# Patient Record
Sex: FEMALE | Race: WHITE
[De-identification: names, ages, dates, MRNs, and addresses within clinical notes are randomized per-mention and may not be internally consistent; named-entity substitution may affect disease eponyms.]

---

## 2017-02-22 ENCOUNTER — HOSPITAL ENCOUNTER (EMERGENCY)
Dept: HOSPITAL 25 - UCCORT | Age: 37
Discharge: HOME | End: 2017-02-22
Payer: COMMERCIAL

## 2017-02-22 VITALS — SYSTOLIC BLOOD PRESSURE: 113 MMHG | DIASTOLIC BLOOD PRESSURE: 63 MMHG

## 2017-02-22 DIAGNOSIS — J32.9: Primary | ICD-10-CM

## 2017-02-22 PROCEDURE — 99212 OFFICE O/P EST SF 10 MIN: CPT

## 2017-02-22 PROCEDURE — G0463 HOSPITAL OUTPT CLINIC VISIT: HCPCS

## 2017-02-22 NOTE — UC
Throat Pain/Nasal Long HPI





- HPI Summary


HPI Summary: 





HA, sinus congestion,  cough for 2 weeks. 





- History of Current Complaint


Chief Complaint: UCGeneralIllness


Stated Complaint: HA,CONGESTION,SINUS PAIN


Time Seen by Provider: 02/22/17 17:13


Hx Obtained From: Patient


Hx Last Menstrual Period: 2/8/17


Pregnant?: No


Onset/Duration: Sudden Onset, Lasting Days


Severity: Moderate


Pain Intensity: 6


Pain Scale Used: 0-10 Numeric


Cough: None


Associated Signs & Symptoms: Positive: Sinus Discomfort, Nasal Discharge





- Epiglottits Risk Factors


Epiglottis Risk Factors: Negative





- Allergies/Home Medications


Allergies/Adverse Reactions: 


 Allergies











Allergy/AdvReac Type Severity Reaction Status Date / Time


 


Bacitracin [From Neosporin] Allergy  OINTMENT Verified 04/20/16 10:31





   CAUSES RASH  


 


Neomycin [From Neosporin] Allergy  OINTMENT Verified 04/20/16 10:31





   CAUSES RASH  


 


Polymyxin B [From Neosporin] Allergy  OINTMENT Verified 04/20/16 10:31





   CAUSES RASH  














PMH/Surg Hx/FS Hx/Imm Hx


Previously Healthy: Yes


Endocrine History Of: Reports: Diabetes





- Surgical History


Surgical History: None


Surgery Procedure, Year, and Place: tubal ligation,plantar fasciatis, right 

hand carpal tunnel, right thumb trigger thumb





- Family History


Known Family History: Positive: Cardiac Disease, Hypertension, Diabetes





- Social History


Alcohol Use: None


Substance Use Type: None


Smoking Status (MU): Never Smoked Tobacco





Review of Systems


Constitutional: Fever, Fatigue


Skin: Negative


Eyes: Negative


ENT: Sore Throat, Nasal Discharge


Respiratory: Negative


Cardiovascular: Negative


Gastrointestinal: Negative


Genitourinary: Negative


Motor: Negative


Neurovascular: Negative


Musculoskeletal: Negative


Neurological: Headache


Psychological: Negative


All Other Systems Reviewed And Are Negative: Yes





Physical Exam


Triage Information Reviewed: Yes


Appearance: Well-Nourished, Ill-Appearing, Pain Distress


Vital Signs: 


 Initial Vital Signs











Temp  100.2 F   02/22/17 17:13


 


Pulse  88   02/22/17 17:13


 


Resp  16   02/22/17 17:13


 


BP  113/63   02/22/17 17:13


 


Pulse Ox  100   02/22/17 17:13











Vital Signs Reviewed: Yes


Eye Exam: Normal


Eyes: Positive: Conjunctiva Clear


ENT Exam: Normal


ENT: Positive: Normal ENT inspection, Pharyngeal erythema, Nasal congestion, 

Nasal drainage, TM bulging


Dental Exam: Normal


Neck exam: Normal


Neck: Positive: Supple, Nontender, No Lymphadenopathy


Respiratory Exam: Normal


Respiratory: Positive: Chest non-tender, Lungs clear, Normal breath sounds


Cardiovascular Exam: Normal


Cardiovascular: Positive: RRR, No Murmur, Pulses Normal


Abdominal Exam: Normal


Abdomen Description: Positive: Nontender, No Organomegaly, Soft


Bowel Sounds: Positive: Present


Musculoskeletal Exam: Normal


Musculoskeletal: Positive: Strength Intact, ROM Intact, No Edema


Neurological Exam: Normal


Neurological: Positive: Alert, Muscle Tone Normal


Psychological Exam: Normal


Skin Exam: Normal





Throat Pain/Nasal Course/Dx





- Course


Course Of Treatment: hx obtained, exam performed, meds reviewed, meds 

prescribed for sinusitis





- Differential Dx/Diagnosis


Differential Diagnosis/HQI/PQRI: Influenza, Laryngitis, Pharyngitis, Sinusitis, 

Tonsillitis, URI


Provider Diagnoses: sinusitis





Discharge





- Discharge Plan


Condition: Stable


Disposition: HOME


Prescriptions: 


Amoxicillin/Clavulanate TAB* [Augmentin *] 875 mg PO BID #14 tab


Fluconazole 150 MG (NF) [Diflucan 150 mg (NF)] 150 mg PO ONCE #1 tab


predniSONE TAB* [Deltasone TAB*] 40 mg PO DAILY #10 tab


Patient Education Materials:  Sinusitis (ED)


Additional Instructions: 


take the medication as prescribed. Increase your fluid intake and get plenty of 

rest.


Tylenol or Ibuprofen for pain and fever.

## 2017-04-04 NOTE — UC
Back Pain HPI





- HPI Summary


HPI Summary: 





37 yo female with the onset of right LBP 4-5 days ago


hurts to twist or lift





no known trauma





no bowel or bladder dysfunction





no radiation of pain down leg











- History of Current Complaint


Chief Complaint: UCBackPain


Stated Complaint: LEFT LOWER BACK PAIN


Time Seen by Provider: 04/04/17 15:49


Hx Obtained From: Patient


Hx Last Menstrual Period: 4/3/17


Onset/Duration: Gradual Onset


Timing: Constant


Severity Initially: Moderate


Severity Currently: Moderate


Pain Intensity: 4 - worse at night/hard time sleeping


Pain Scale Used: 0-10 Numeric


Back Pain: Is Discrete @ - see image


Character: Dull, Throbbing, Spasmodic


Aggravating: Movement, Lifting, Bending


Alleviating: Rest


Associated Signs And Symptoms: Positive: Negative





- Allergies/Home Medications


Allergies/Adverse Reactions: 


 Allergies











Allergy/AdvReac Type Severity Reaction Status Date / Time


 


Bacitracin [From Neosporin] Allergy  OINTMENT Verified 04/04/17 15:50





   CAUSES RASH  


 


Neomycin [From Neosporin] Allergy  OINTMENT Verified 04/04/17 15:50





   CAUSES RASH  


 


Polymyxin B [From Neosporin] Allergy  OINTMENT Verified 04/04/17 15:50





   CAUSES RASH  














PMH/Surg Hx/FS Hx/Imm Hx


Previously Healthy: Yes


Endocrine History Of: Reports: Diabetes





- Surgical History


Surgical History: None


Surgery Procedure, Year, and Place: tubal ligation,plantar fasciatis, right 

hand carpal tunnel, right thumb trigger thumb





- Family History


Known Family History: Positive: Cardiac Disease, Hypertension, Diabetes





- Social History


Alcohol Use: None


Substance Use Type: None


Smoking Status (MU): Never Smoked Tobacco





Review of Systems


Constitutional: Negative


Skin: Negative


Eyes: Negative


ENT: Negative


Respiratory: Negative


Cardiovascular: Negative


Gastrointestinal: Negative


Genitourinary: Negative


Motor: Negative


Neurovascular: Negative


Musculoskeletal: Myalgia


Neurological: Negative


Psychological: Negative


All Other Systems Reviewed And Are Negative: Yes





Physical Exam


Triage Information Reviewed: Yes


Appearance: Well-Appearing, No Pain Distress, Well-Nourished


Vital Signs: 


 Initial Vital Signs











Temp  99.5 F   04/04/17 15:46


 


Pulse  83   04/04/17 15:46


 


Resp  16   04/04/17 15:46


 


BP  113/71   04/04/17 15:46


 


Pulse Ox  99   04/04/17 15:46











Eye Exam: Normal


Eyes: Positive: Conjunctiva Clear


ENT: Positive: Hearing grossly normal.  Negative: Nasal congestion, Nasal 

drainage, Tonsillar swelling, Tonsillar exudate, Trismus, Muffled/hoarse voice


Neck: Positive: Supple, Nontender


Respiratory: Positive: Lungs clear, Normal breath sounds, No respiratory 

distress


Cardiovascular: Positive: RRR, No Murmur


Musculoskeletal: Positive: ROM Intact, No Edema


Neurological: Positive: Alert, Other: - DTRs brisk and symmetrical/(-) SLR


Psychological Exam: Normal


Skin Exam: Normal





Back Pain Course/Dx





- Differential Dx/Diagnosis


Provider Diagnoses: acute lumbar strain





Discharge





- Discharge Plan


Condition: Stable


Disposition: HOME


Prescriptions: 


Cyclobenzaprine TAB* [Flexeril TAB*] 5 mg PO TID PRN #21 tab


 PRN Reason: Spasms


HYDROcodone/ACETAMIN 5-325 MG* [Norco 5-325 TAB*] 1 tab PO Q4H PRN #10 tab MDD 2


 PRN Reason: Pain


Naproxen [Naproxen 500 MG TABS] 500 mg PO BID PRN #30 tab


 PRN Reason: Pain


Patient Education Materials:  Low Back Strain (ED)


Referrals: 


Tatiana Quiles [Primary Care Provider] - 1 Week (if not better)


Additional Instructions: 


don't take narcotic or muscle relaxant and drive or work





Images


Front/Back of Body, Lg (Mono): 


  __________________________














  __________________________





 1 - pain here
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
## 2018-10-31 NOTE — UC
Throat Pain/Nasal Long HPI





- HPI Summary


HPI Summary: 


37-year-old female presents with 4 day history of sore throat.  Denies fever, 

chills, ear pain, nasal congestion, dysphagia, cough, chest pain, shortness of 

breath, abdominal pain, nausea, or vomiting.








- History of Current Complaint


Chief Complaint: UCGeneralIllness


Stated Complaint: SORE THROAT


Time Seen by Provider: 10/31/18 18:09


Hx Obtained From: Patient


Hx Last Menstrual Period: 10/13/18


Pregnant?: No


Onset/Duration: Gradual Onset, Lasting Days - 4


Severity: Moderate


Pain Intensity: 8


Cough: None


Associated Signs & Symptoms: Negative: Dysphagia, Drooling, Wheezing, Hoarseness

, Sinus Discomfort, Nasal Discharge, Fever, Vomiting, Rash





- Allergies/Home Medications


Allergies/Adverse Reactions: 


 Allergies











Allergy/AdvReac Type Severity Reaction Status Date / Time


 


amoxicillin [From Augmentin] Allergy Intermediate Hives Verified 10/31/18 18:10


 


bacitracin Allergy Intermediate Rash Verified 10/31/18 18:10


 


clavulanic acid Allergy Intermediate Hives Verified 10/31/18 18:10





[From Augmentin]     














PMH/Surg Hx/FS Hx/Imm Hx


Previously Healthy: Yes


Endocrine History: Diabetes





- Surgical History


Surgical History: Yes


Surgery Procedure, Year, and Place: tubal ligation,plantar fasciatis, right 

hand carpal tunnel, right thumb trigger thumb





- Family History


Known Family History: Positive: Cardiac Disease, Hypertension


   Negative: Diabetes





- Social History


Occupation: Employed Full-time


Lives: With Family


Alcohol Use: None


Substance Use Type: None


Smoking Status (MU): Never Smoked Tobacco





Review of Systems


Constitutional: Negative


Skin: Negative


Eyes: Negative


ENT: Sore Throat


Respiratory: Negative


Cardiovascular: Negative


Gastrointestinal: Negative


Is Patient Immunocompromised?: No


All Other Systems Reviewed And Are Negative: Yes





Physical Exam


Triage Information Reviewed: Yes


Appearance: Well-Appearing, No Pain Distress, Obese


Vital Signs: 


 Initial Vital Signs











Temp  97.6 F   10/31/18 18:00


 


Pulse  77   10/31/18 18:00


 


Resp  16   10/31/18 18:00


 


BP  119/92   10/31/18 18:00


 


Pulse Ox  100   10/31/18 18:00











Vital Signs Reviewed: Yes


Eyes: Positive: Conjunctiva Clear.  Negative: Discharge


ENT: Positive: Hearing grossly normal, Pharyngeal erythema - With cobblestoning

, TMs normal, Uvula midline.  Negative: Nasal congestion, Nasal drainage, 

Tonsillar swelling, Tonsillar exudate, Trismus, Sinus tenderness


Neck: Positive: Supple, Nontender, No Lymphadenopathy


Respiratory: Positive: Lungs clear, Normal breath sounds, No respiratory 

distress


Cardiovascular: Positive: RRR, No Murmur


Neurological: Positive: Alert


Skin Exam: Normal





Throat Pain/Nasal Course/Dx





- Course


Course Of Treatment: 37-year-old female presents with 4 day history of sore 

throat.  Afebrile.  Exam remarkable for some pharyngeal erythema without 

tonsillar edema or exudate.  Symptoms are likely viral in nature.  Recommend 

symptomatic treatment.  She is to follow-up with her primary care provider in 7 

days if symptoms persist.  Warning symptoms were reviewed with the patient.  

Verbalizes understanding and agrees with plan of care.





- Differential Dx/Diagnosis


Differential Diagnosis/HQI/PQRI: Mononucleosis, Pharyngitis, Tonsillitis


Provider Diagnoses: Viral pharyngitis





Discharge





- Sign-Out/Discharge


Documenting (check all that apply): Patient Departure


All imaging exams completed and their final reports reviewed: No Studies





- Discharge Plan


Condition: Stable


Disposition: HOME


Patient Education Materials:  Pharyngitis (ED)


Referrals: 


Flower Gallagher PA [Primary Care Provider] - 7 Days (If symptoms persist)


Additional Instructions: 


Your rapid strep test in the clinic today was negative.  Your symptoms are 

likely from a viral infection.  Viral infections do not respond to antibiotics 

and typically run their course over 7-10 days.





Use salt water gargles several times a day for your sore throat.





Take acetaminophen (Tylenol) or ibuprofen (Advil, Motrin) according to 

directions as needed for fever or pain.





You may also use Chloraseptic spray or Cepacol lozenges for some temporary pain 

relief from your sore throat.





Follow-up with your primary care provider in 7 days if symptoms persist.





Seek immediate medical attention if you have a persistent fever greater than 

100.5 F despite taking acetaminophen or ibuprofen, you are unable to swallow, 

has difficulty breathing, or have any worsening of symptoms.





- Billing Disposition and Condition


Condition: STABLE


Disposition: Home

## 2018-10-31 NOTE — XMS REPORT
Ana Laura Horton

 Created on:2018



Patient:Ana Laura Horton

Sex:Female

:1980

External Reference #:[id number]





Demographics







 Address  [address]



   [place] [address]

 

 Home Phone  [phone]

 

 Mobile Phone  [phone]

 

 Preferred Language  English

 

 Marital Status  Not  Or 

 

 Synagogue Affiliation  Unknown

 

 Race  White

 

 Ethnic Group  Not  Or 









Author







 Organization  [facility]

 

 Address  [address]



   [place] [address]

 

 Phone  [phone]









Support







 Name  Relationship  Address  Phone

 

 cheri Porter  Mother  Unavailable  [phone]

 

 Sunil Miramontes  Significant Other  Unavailable  [phone]









Care Team Providers







 Name  Role  Phone

 

 Flower Gallagher PA  Care Team Information   Unavailable









Payers







 Type  Date  Identification Numbers  Payment Provider  Subscriber

 

 Health Maintenance    Policy Number:  [facility] / Neftaly Horton



 Organization (O)    [id number]  Plan  









 Group Number: [id number]  [address]

 

 PayID: [id number]  [place] [address]







Problems







 Description

 

 No Information







Social History







 Type  Date  Description  Comments

 

 Marital Status    Single  

 

 Occupation    /  [employer]

 

 Occupation      [employer]

 

 ETOH Use    Denies alcohol use  

 

 Smoking    Patient has never smoked  

 

 Daily Caffeine    Consumes on average 4 cups of coffee per day  







Allergies, Adverse Reactions, Alerts







 Date  Description  Reaction  Status  Severity  Comments

 

 2016  Neosporin    active    Rash

 

 2017  Augmentin    active    Hives







Medications







 Medication  Date  Status  Form  Strength  Qnty  SIG  Indications  Ordering



                 Provider

 

 Cyclobenzaprine  10/25/  Active  Tablets  5mg  30tabs  1 tablet  M54.5  Randall,



 HCL            by mouth    Braden,



             as needed    DO



             at    



             bedtime    

 

 Sumatriptan  /  Active  Tablets  25mg  30tabs  1 tabelt  G43.009  Aileen,



 Succinate            by mouth    Flower,



             as needed    PA



             for    



             headache    

 

 Metformin HCL  03/10/  Active  Tablets  500mg  180tab  1 by  E11.65  Randall



           s  mouth    Braden,



             twice a    DO



             day    

 

 Multi For Her  /  Active  Tablets      1 by    Noble,



   2016          mouth    Braden,



             every day    DO

 

                 

 

 Fluconazole  /  Hx  Tablets  150mg  1tabs  1 tablet  J01.00  Randall,



   2017 -          by mouth    Braden,



   /          x 1    DO



   2017              

 

 Bactrim DS  /  Hx  Tablets  800-160mg  14tabs  1 pill  J01.00  Digiovann



   2017 -          twice a    a,



   /          day for 7    Mariano,



   2017          days    MD

 

 Amoxicillin/Clavu  /  Hx  Tablets  875-125mg  14tabs  1 by  J01.00  Randall



 lanate Potassium  2017 -          mouth    Braden,



   /          twice a    DO



   2017          day x 7    



             days    

 

 Fluconazole  /  Hx  Tablets  150mg  1tabs  1 tablet  J01.00  Randall,



   2017 -          by mouth    Braden,



   /          x 1    DO



                 

 

 Fluconazole  /  Hx  Tablets  150mg  1tabs  1 tablet    Aileen,



   2017 -          by mouth    Flower,



   10/13/          x 1    PA



                 

 

 Cephalexin  /  Hx  Capsules  500mg  14caps  1 capsule  N89.8  Aileen,



    -          by mouth    Flower,



   10/13/          twice    PA



             daily for    



             7 days    

 

 Gabapentin  /  Hx  Capsules  300mg  14caps  1 by  M54.31  Randall,



    -          mouth qhs    Braden,



   /          x 14 days    DO



                 

 

 Sulfamethoxazole/  10/18/  Hx  Tablets  800-160mg  20tabs  1 by  L02.216  Randall
,



 Trimethoprim DS   -          mouth    Braden,



   10/28/          twice a    DO



             day    

 

 Fluconazole  10/18/  Hx  Tablets  150mg  1tabs  1 by    Noble,



    -          mouth    Braden,



   10/19/          daily as    DO



   2016          needed    

 

 Mupirocin Calcium  /  Hx  Cream  2%  15gm  apply to  R21  Noble,



   2016 -          lower    Braden,



   /          LEFT lip    DO



             bid x 10    



             days    

 

 Fluconazole  /  Hx  Tablets  150mg  1tabs  1 by    Noble,



    -          mouth    Braden,



   /          daily as    DO



   2016          needed    

 

 Sulfamethoxazole/  /  Hx  Tablets  800-160mg  10tabs  1 by    Randall,



 Trimethoprim DS   -          mouth    Braden,



   /          twice a    DO



             day    

 

 Benzonatate  /  Hx  Capsules  200mg  30caps  1 by  R05  Randall,



   2016 -          mouth    Braden,



   08/12/          three    DO



   2016          times a    



             day as    



             needed    



             cough    







Immunizations







 CPT Code  Status  Date  Vaccine  Lot #

 

   Given  2018  Flu Vaccine NOS  

 

   Given  09/15/2017  Fluvirin Immunization  







Vital Signs







 Date  Vital  Result  Comment

 

 10/25/2018  Weight  155.00 lb  









 Heart Rate  74 /min  

 

 BP Systolic  110 mmHg  

 

 BP Diastolic  72 mmHg  

 

 Respiratory Rate  18 /min  

 

 Height  60 inches  5'0"

 

 BMI (Body Mass Index)  30.3 kg/m2  









 2018  Weight  161.00 lb  









 Heart Rate  74 /min  

 

 BP Systolic  110 mmHg  

 

 BP Diastolic  68 mmHg  

 

 Respiratory Rate  18 /min  

 

 Height  59.75 inches  4'11.75"

 

 BMI (Body Mass Index)  31.7 kg/m2  









 2017  Body Temperature  99.0 F  









 Weight  157.00 lb  

 

 Heart Rate  76 /min  

 

 BP Systolic  106 mmHg  

 

 BP Diastolic  66 mmHg  

 

 Respiratory Rate  18 /min  

 

 Height  59.75 inches  4'11.75"

 

 BMI (Body Mass Index)  30.9 kg/m2  









 10/13/2017  Weight  156.00 lb  









 Heart Rate  70 /min  

 

 BP Systolic  110 mmHg  

 

 BP Diastolic  64 mmHg  

 

 Respiratory Rate  18 /min  

 

 Height  59.75 inches  4'11.75"

 

 BMI (Body Mass Index)  30.7 kg/m2  









 2017  Weight  156.00 lb  









 Heart Rate  70 /min  

 

 BP Systolic  136 mmHg  

 

 BP Diastolic  70 mmHg  

 

 Respiratory Rate  18 /min  

 

 Height  59.75 inches  4'11.75"

 

 BMI (Body Mass Index)  30.7 kg/m2  









 2017  Weight  154.00 lb  









 Heart Rate  70 /min  

 

 BP Systolic  120 mmHg  

 

 BP Diastolic  70 mmHg  

 

 Respiratory Rate  18 /min  

 

 Height  59.75 inches  4'11.75"

 

 BMI (Body Mass Index)  30.3 kg/m2  









 2017  Body Temperature  99.3 F  









 Weight  160.00 lb  

 

 Heart Rate  68 /min  

 

 BP Systolic  112 mmHg  

 

 BP Diastolic  72 mmHg  

 

 Respiratory Rate  18 /min  

 

 Height  59.75 inches  4'11.75"

 

 BMI (Body Mass Index)  31.5 kg/m2  









 2017  Body Temperature  99.1 F  









 Weight  151.00 lb  

 

 BP Systolic  114 mmHg  

 

 BP Diastolic  60 mmHg  

 

 Height  59.75 inches  4'11.75"

 

 BMI (Body Mass Index)  29.7 kg/m2  









 2017  Body Temperature  99.5 F  









 Weight  155.00 lb  

 

 Heart Rate  86 /min  

 

 BP Systolic  122 mmHg  

 

 BP Diastolic  72 mmHg  

 

 Respiratory Rate  19 /min  

 

 Height  59.75 inches  4'11.75"

 

 O2 % BldC Oximetry  99 %  Ra

 

 BMI (Body Mass Index)  30.5 kg/m2  









 2016  Weight  158.00 lb  









 Heart Rate  84 /min  

 

 BP Systolic  130 mmHg  

 

 BP Diastolic  80 mmHg  

 

 Respiratory Rate  18 /min  

 

 Height  59.75 inches  4'11.75"

 

 BMI (Body Mass Index)  31.1 kg/m2  









 11/15/2016  Body Temperature  98.9 F  









 Weight  155.00 lb  

 

 Heart Rate  72 /min  

 

 BP Systolic  110 mmHg  

 

 BP Diastolic  64 mmHg  

 

 Respiratory Rate  18 /min  

 

 Height  59.75 inches  4'11.75"

 

 BMI (Body Mass Index)  30.5 kg/m2  









 10/18/2016  Body Temperature  98.4 F  









 Weight  148.00 lb  

 

 Heart Rate  80 /min  

 

 BP Systolic  112 mmHg  

 

 BP Diastolic  72 mmHg  

 

 Respiratory Rate  18 /min  

 

 Height  59.75 inches  4'11.75"

 

 BMI (Body Mass Index)  29.1 kg/m2  









 2016  Body Temperature  98.3 F  









 Weight  155.00 lb  

 

 Heart Rate  70 /min  

 

 BP Systolic  110 mmHg  

 

 BP Diastolic  72 mmHg  

 

 Respiratory Rate  18 /min  

 

 Height  59.75 inches  4'11.75"

 

 BMI (Body Mass Index)  30.5 kg/m2  









 08/15/2016  Body Temperature  98.6 F  









 Weight  153.00 lb  

 

 Heart Rate  74 /min  

 

 BP Systolic  110 mmHg  

 

 BP Diastolic  72 mmHg  

 

 Respiratory Rate  18 /min  

 

 Height  59.75 inches  4'11.75"

 

 BMI (Body Mass Index)  30.1 kg/m2  









 2016  Body Temperature  99.3 F  









 Weight  152.00 lb  

 

 Heart Rate  95 /min  

 

 BP Systolic  122 mmHg  

 

 BP Diastolic  78 mmHg  

 

 Respiratory Rate  18 /min  

 

 Height  59.75 inches  4'11.75"

 

 O2 % BldC Oximetry  99 %  

 

 BMI (Body Mass Index)  29.9 kg/m2  









 2016  Weight  152.00 lb  









 Heart Rate  80 /min  

 

 BP Systolic  108 mmHg  

 

 BP Diastolic  60 mmHg  

 

 Respiratory Rate  18 /min  

 

 Height  59.75 inches  4'11.75"

 

 BMI (Body Mass Index)  29.9 kg/m2  









 2016  Body Temperature  99.4 F  









 Weight  158.00 lb  

 

 Heart Rate  74 /min  

 

 BP Systolic  120 mmHg  

 

 BP Diastolic  74 mmHg  

 

 Respiratory Rate  18 /min  

 

 Height  59.75 inches  4'11.75"

 

 BMI (Body Mass Index)  31.1 kg/m2  









 2016  Weight  162.00 lb  









 Heart Rate  78 /min  

 

 BP Systolic  112 mmHg  

 

 BP Diastolic  82 mmHg  

 

 Respiratory Rate  18 /min  

 

 Height  59.75 inches  4'11.75"

 

 BMI (Body Mass Index)  31.9 kg/m2  









 03/10/2016  Weight  169.00 lb  









 Heart Rate  68 /min  

 

 BP Systolic  126 mmHg  

 

 BP Diastolic  70 mmHg  

 

 Respiratory Rate  18 /min  

 

 Height  59.75 inches  4'11.75"

 

 BMI (Body Mass Index)  33.3 kg/m2  









 2016  Weight  167.00 lb  









 Heart Rate  110 /min  

 

 BP Systolic  118 mmHg  

 

 BP Diastolic  76 mmHg  

 

 Respiratory Rate  18 /min  

 

 Height  59.75 inches  4'11.75"

 

 O2 % BldC Oximetry  98 %  

 

 BMI (Body Mass Index)  32.9 kg/m2  







Results







 Test  Date  Test  Result  H/L  Range  Note

 

 Hemoglobin A1c  10/19/2018  Hemoglobin A1c  6.7 %  High  4.1-5.9  









 Estimated Average Glucose Calc  146 mg/dL  High    









 Comprehensive Metabolic-RL  10/19/2018  Sodium  142 mmol/L    135-146  1









 Potassium  3.5 mmol/L    3.5-5.2  

 

 Chloride#  102 mmol/L      2

 

 Carbon Dioxide  29 mmol/L    24-34  

 

 Glucose  117 mg/dL  High    

 

 BUN  12 mg/dL    6-26  

 

 Creatinine  0.7 mg/dL    0.5-1.4  

 

 Calcium  9.6 mg/dL    8.5-10.2  

 

 Total Protein  6.7 g/dL    6.0-8.0  

 

 Albumin  4.5 g/dL    3.6-4.9  

 

 Globulin  2.2 g/dL    2.0-3.5  

 

 A/G Ratio  2.0 Ratio    1.0-2.2  

 

 Total Bilirubin  0.5 mg/dL    0.1-1.3  

 

 Alkaline Phosphatase  58 U/L      

 

 Alt  26 U/L    3-42  

 

 Ast  17 U/L    8-42  

 

  Angy Egfr  >60    >60  3

 

 Non  Angy Egfr  >60    >60  4

 

 Anion Gap  11 mmol/L    5-15  5









 Lipid  10/19/2018  Cholesterol  205 mg/dL  High    









 Triglycerides  173 mg/dL      

 

 HDL  49 mg/dL    35-85  6

 

 Chol/ HDL Ratio  4.2 ratio    3.7-5.6  

 

 VLDL  35 mg/dL  High  2-29  

 

 LDL (Calc)  121 mg/dL  High  20-99  7









 Hemoglobin A1c  2018  Hemoglobin A1c  7.4 %  High  4.1-5.9  









 Estimated Average Glucose Calc  166 mg/dL  High    









 Comprehensive Met Panel-FCMG  2018  Sodium  138 mmol/L    135-146  8









 Potassium  4.0 mmol/L    3.5-5.2  

 

 Chloride#  100 mmol/L      9

 

 Carbon Dioxide  33 mmol/L    24-34  

 

 Glucose  123 mg/dL  High    

 

 BUN  13 mg/dL    6-26  

 

 Creatinine  0.6 mg/dL    0.5-1.4  

 

 Calcium  9.7 mg/dL    8.5-10.2  

 

 Total Protein  6.7 g/dL    6.0-8.0  

 

 Albumin  4.7 g/dL    3.6-4.9  

 

 Globulin  2.0 g/dL    2.0-3.5  

 

 A/G Ratio  2.4 Ratio  High  1.0-2.2  

 

 Total Bilirubin  0.6 mg/dL    0.1-1.3  

 

 Alkaline Phosphatase  60 U/L      

 

 Alt  20 U/L    3-42  

 

 Ast  16 U/L    8-42  

 

  Angy Egfr  >60    >60  10

 

 Non  Angy Egfr  >60    >60  11

 

 Anion Gap  5 mmol/L    5-15  12









 CBC With Auto Diff  2018  WBC  10.8 K/uL    4.1-11.0  









 RBC  5.08 M/uL    4.00-5.40  

 

 Hemoglobin  13.8 gm/dL    12.0-16.0  

 

 Hematocrit  42.1 %    36.0-47.0  

 

 MCV  82.8 fL    80.0-97.0  

 

 MCH  27.2 pg    27.0-32.0  

 

 MCHC  32.9 g/dL    32.0-36.0  

 

 RDW  13.1 %    11.5-14.5  

 

 PLT Count  243 K/ul    140-400  

 

 MPV  9.6 FL    7.1-10.7  

 

 Neutrophil  61.0 %    35.0-75.0  

 

 Lymphocyte  31.0 %    16.0-52.0  

 

 Monocyte  6.0 %    2.0-10.0  

 

 Eosinophil  0.9 %    0.0-5.0  

 

 Basophil  1.1 %    0.0-4.0  

 

 Abs Neutrophils  6.6 K/uL    2.1-8.0  

 

 Abs Lymphocytes  3.4 K/uL    0.8-5.5  

 

 Abs Monocytes  0.7 K/uL    0.1-1.0  

 

 Abs Eosinophils  0.1 K/uL    0.0-0.5  

 

 Abs Basophils  0.1 K/uL    0.0-0.3  









 Basic (BMP)  2018  Sodium  138 mmol/L    135-146  13









 Potassium  3.9 mmol/L    3.5-5.2  

 

 Chloride#  100 mmol/L      14

 

 Carbon Dioxide  34 mmol/L    24-34  

 

 Glucose  123 mg/dL  High    

 

 BUN  13 mg/dL    6-26  

 

 Creatinine  0.7 mg/dL    0.5-1.4  

 

 Calcium  9.7 mg/dL    8.5-10.2  

 

 Non  Angy Egfr  >60    >60  15

 

  Angy Egfr  >60    >60  16

 

 Anion Gap  4 mmol/L  Low  5-15  17









 Lipid  2018  Cholesterol  231 mg/dL  High    









 Triglycerides  130 mg/dL      

 

 HDL  55 mg/dL    35-85  18

 

 Chol/ HDL Ratio  4.2 ratio    3.7-5.6  

 

 VLDL  26 mg/dL    2-29  

 

 LDL (Calc)  150 mg/dL  High  20-99  19









 Hemoglobin W4l-TNOG  10/07/2017  Glycohemoglobin (A1c)  6.9 %  High  4.2-6.3  
20, 21









 eAG  151 mg/dL      20









 Basic Metabolic Panel  10/07/2017  Glucose  151 mg/dL  High    20









 BUN  15 mg/dL    7-18  20

 

 Creatinine  0.7 mg/dL    0.6-1.3  20

 

 Glom Filtration Rate, Estimate  >60 mL/min    >60  20

 

 If   >60 mL/min    >60  20, 22

 

 BUN/Creat  21.4 ratio      20

 

 Sodium  143 mmol/L    136-145  20

 

 Potassium  3.5 mmol/L    3.5-5.1  20

 

 Chloride  107 mmol/L      20

 

 Carbon Dioxide  28 mmol/L    21-32  20

 

 Anion Gap  8 mEq/L    8-16  20

 

 Calcium  9.0 mg/dL    8.5-10.1  20









 LDL Cholesterol Profile  10/07/2017  Cholesterol  206 mg/dL  High  <200  20, 23









 Triglycerides  148 mg/dL    <150  20, 24

 

 HDL Cholesterol  49 mg/dL    >40  20, 25

 

 LDL-Cholesterol  127 mg/dL    < 100  20, 26









 Affirm  2017  Trichomonas Vaginalis  Negative    Negative  









 Gardnerella Vaginalis  Negative    Negative  

 

 Candida Species  Negative    Negative  









 Basic (BMP)  2017  Sodium  141 mmol/L    135-146  27









 Potassium  4.1 mmol/L    3.5-5.2  

 

 Chloride#  103 mmol/L      28

 

 Carbon Dioxide  30 mmol/L    24-34  

 

 Glucose  141 mg/dL  High    

 

 BUN  12 mg/dL    6-26  

 

 Creatinine  0.6 mg/dL    0.5-1.4  

 

 Calcium  9.3 mg/dL    8.5-10.2  

 

 Non  Angy Egfr  >60    >60  29

 

  Angy Egfr  >60    >60  30

 

 Anion Gap  12 mmol/L    7-16  31









 CBC With Auto Diff  2017  WBC  8.4 K/uL    4.1-11.0  









 RBC  4.91 M/uL    4.00-5.40  

 

 Hemoglobin  13.7 gm/dL    12.0-16.0  

 

 Hematocrit  41.4 %    36.0-47.0  

 

 MCV  84.3 fL    80.0-97.0  

 

 MCH  27.9 pg    27.0-32.0  

 

 MCHC  33.1 g/dL    32.0-36.0  

 

 RDW  13.4 %    11.5-14.5  

 

 PLT Count  230 K/ul    140-400  

 

 Neutrophil  66.8 %    35.0-75.0  

 

 Lymphocyte  24.6 %    16.0-52.0  

 

 Monocyte  6.9 %    2.0-10.0  

 

 Eosinophil  0.7 %    0.0-5.0  

 

 Basophil  1.0 %    0.0-4.0  

 

 Abs Neutrophils  5.6 K/uL    2.1-8.0  

 

 Abs Lymphocytes  2.1 K/uL    0.8-5.5  

 

 Abs Monocytes  0.6 K/uL    0.1-1.0  

 

 Abs Eosinophils  0.1 K/uL    0.0-0.5  

 

 Abs Basophils  0.1 K/uL    0.0-0.3  









 Routine Culture W/ Gram  2017  Gram Stain  RARE WHITE BLOOD <SEE      32
, 33



 Stain      NOTE>      









 Gram Stain  RARE GRAM POSITI <SEE NOTE>      32, 34

 

 Aerobic Culture  NO PATHOGENS ISO <SEE NOTE>      32, 35

 

 Quantity  MANY      32









 Wound Culture-RL  2017  Wound Culture  SEE NOTE      36

 

 Laboratory test finding  2016  Hemoglobin A1c  6.5 %  High  4.1-5.9  

 

 Basic (BMP)  2016  Sodium  137 mmol/L    134-142  









 Potassium  3.7 mmol/L    3.5-5.2  

 

 Chloride  100 mmol/L      

 

 Carbon Dioxide  26 mmol/L    24-34  

 

 Glucose  106 mg/dL  High    

 

 BUN  12 mg/dL    6-26  

 

 Creatinine  0.6 mg/dL    0.5-1.4  

 

 Calcium  9.5 mg/dL    8.5-10.2  

 

 Anion Gap  15 mmol/L  High  6-14  

 

 Non  Angy Egfr  >60    >60  37

 

  Angy Egfr  >60    >60  38









 Lipid  2016  Cholesterol  199 mg/dL      









 Triglycerides  124 mg/dL      

 

 HDL  55 mg/dL    35-85  39

 

 Chol/ HDL Ratio  3.6 ratio  Low  3.7-5.6  

 

 VLDL  25 mg/dL    2-29  

 

 LDL (Calc)  119 mg/dL  High  20-99  40









 Affirm  2016  Trichomonas Vaginalis  Negative    Negative  









 Gardnerella Vaginalis  Negative    Negative  

 

 Candida Species  Negative    Negative  









 Basic (BMP)  2016  Sodium  135 mmol/L    134-142  









 Potassium  3.9 mmol/L    3.5-5.2  

 

 Chloride  99 mmol/L      

 

 Carbon Dioxide  26 mmol/L    24-34  

 

 Glucose  127 mg/dL  High    

 

 BUN  11 mg/dL    6-  

 

 Creatinine  0.8 mg/dL    0.5-1.4  

 

 Calcium  9.7 mg/dL    8.5-10.2  

 

 Anion Gap  14 mmol/L      

 

 Non  Angy Egfr  >60    >60  41

 

  Angy Egfr  >60    >60  42









 Laboratory test finding  2016  Hemoglobin A1c  7.4 %  High  4.1-5.9  

 

 Affirm  2016  Trichomonas Vaginalis  Negative    Negative  









 Gardnerella Vaginalis  Negative    Negative  

 

 Candida Species  Negative    Negative  









 Laboratory test finding  2016  Urine Culture  Microbiology res <SEE      
43



       NOTE>      

 

 Basic (BMP)  2016  Sodium  138 mmol/L    134-142  









 Potassium  3.6 mmol/L    3.5-5.2  

 

 Chloride  101 mmol/L      

 

 Carbon Dioxide  28 mmol/L    24-34  

 

 Glucose  122 mg/dL  High    

 

 BUN  11 mg/dL    6-26  

 

 Creatinine  0.7 mg/dL    0.5-1.4  

 

 Calcium  9.1 mg/dL    8.5-10.2  

 

 Anion Gap  13 mmol/L      

 

 Non  Angy Egfr  >60    >60  44

 

  Angy Egfr  >60    >60  45









 Lipid  2016  Cholesterol  176 mg/dL      









 Triglycerides  159 mg/dL      

 

 HDL  37 mg/dL    35-85  46

 

 Chol/ HDL Ratio  4.8 ratio    3.7-5.6  

 

 VLDL  32 mg/dL  High  2-29  

 

 LDL (Calc)  107 mg/dL  High  20-99  47









 Laboratory test finding  2016  Hemoglobin A1c  7.0 %  High  4.1-5.9  

 

 Laboratory test finding  2016  Throat PO Culture  SEE NOTE      48

 

 Laboratory test finding  2016  Glucose  242 mg/dL  High    









 Hemoglobin A1c  9.0 %  High  4.1-5.9  









 CBC With Auto Diff  2016  WBC  12.1 K/uL  High  4.1-11.0  









 RBC  5.19 M/uL    4.00-5.40  

 

 Hemoglobin  14.4 gm/dL    12.0-16.0  

 

 Hematocrit  43.5 %    36.0-47.0  

 

 MCV  83.9 fL    80.0-97.0  

 

 MCH  27.7 pg    27.0-32.0  

 

 MCHC  33.0 g/dL    32.0-36.0  

 

 RDW  13.2 %    11.5-14.5  

 

 PLT Count  246 K/ul    140-400  

 

 Neutrophil  65.3 %    35.0-75.0  

 

 Lymphocyte  27.0 %    16.0-52.0  

 

 Monocyte  5.9 %    2.0-10.0  

 

 Eosinophil  1.1 %    0.0-5.0  

 

 Basophil  0.7 %    0.0-4.0  

 

 Abs Neutrophils  7.9 K/uL    2.1-8.0  

 

 Abs Lymphocytes  3.3 K/uL    0.8-5.5  

 

 Abmon  0.7 K/uL    0.1-1.0  

 

 Abs Eosinophils  0.1 K/uL    0.0-0.5  

 

 Abs Basophils  0.1 K/uL    0.0-0.3  









 Comprehensive Metabolic (CMP)  2016  Sodium  137 mmol/L    134-142  









 Potassium  3.4 mmol/L  Low  3.5-5.2  

 

 Chloride  99 mmol/L      

 

 Carbon Dioxide  28 mmol/L    24-34  

 

 Glucose  241 mg/dL  High    

 

 BUN  11 mg/dL    6-26  

 

 Creatinine  0.7 mg/dL    0.5-1.4  

 

 Calcium  9.2 mg/dL    8.5-10.2  

 

 Total Protein  6.9 g/dL    6.0-8.0  

 

 Albumin  4.4 g/dL    3.6-4.9  

 

 Globulin  2.5 g/dL    2.0-3.5  

 

 A/G Ratio  1.8 Ratio    1.0-2.2  

 

 Total Bilirubin  0.4 mg/dL    0.1-1.3  

 

 Alkaline Phosphatase  91 U/L      

 

 Alt  25 U/L    3-42  

 

 Ast  16 U/L    8-42  

 

 Anion Gap  13 mmol/L    6-14  

 

 African Angy Egfr  >60    >60  49

 

 Non  Angy Egfr  >60    >60  50









 Laboratory test finding  2016  Esr  5 mm/hr    0-20  









 1  Updated reference range on new analyzer 3-2017

 

 2  Updated reference range on new analyzer 3-2017

 

 3  Concerning GFR Guidelines for  Americans:



   Normal function or mild renal disease, if clinically at risk:  >/=60



   mL/min



   Moderately decreased:  30-59



   Severely decreased:  15-29



   Renal failure:  <15

 

 4  Concerning GFR Guidelines:



   Normal function or mild renal disease, if clinically at risk:  >/=60



   mL/min



   Moderately decreased:  30-59



   Severely decreased:  15-29



   Renal failure:  <15



   



   Glomerular Filtration Rate (GFR) is estimated based on the MDRD



   equation, which assumes a steady state for creatinine as recommended



   by the National Kidney Disease Education Program in conjunction with



   the National Institutes of Health and the National Kidney Foundation.



   



   Clinical conditions in which it may be necessary to measure GFR by



   using clearance methods include extremes of age and body size, severe



   malnutrition or obesity, diseases of skeletal muscle, paraplegia or



   quadriplegia, vegetarian diet, rapidly changing kidney function, and



   calculation of the dose of potentially toxic drugs that are excreted



   by the kidneys.

 

 5  Updated Reference Range 

 

 6  Per NCEP ATP III Guidelines:



   Results lower than 40 mg/dL are suggestive of increased risk for



   coronary artery disease.  Results > or=to 60 mg/dL are considered a



   negative risk factor.

 

 7  Per NCEP ATP III Guidelines:



   Normal Population <130



   Patients with medical conditions: CHD/DM



   Optimal: <100



   Borderline high: 130-159



   High: 160-189



   Very high: >189

 

 8  Updated reference range on new analyzer 3-2017

 

 9  Updated reference range on new analyzer 3-2017

 

 10  Concerning GFR Guidelines for  [place]:



   Normal function or mild renal disease, if clinically at risk:  >/=60



   mL/min



   Moderately decreased:  30-59



   Severely decreased:  15-29



   Renal failure:  <15

 

 11  Concerning GFR Guidelines:



   Normal function or mild renal disease, if clinically at risk:  >/=60



   mL/min



   Moderately decreased:  30-59



   Severely decreased:  15-29



   Renal failure:  <15



   



   Glomerular Filtration Rate (GFR) is estimated based on the MDRD



   equation, which assumes a steady state for creatinine as recommended



   by the National Kidney Disease Education Program in conjunction with



   the National Institutes of Health and the National Kid[employer] Foundation.



   



   Clinical conditions in which it may be necessary to measure GFR by



   using clearance methods include extremes of age and body size, severe



   malnutrition or obesity, diseases of skeletal muscle, paraplegia or



   quadriplegia, vegetarian diet, rapidly changing kidney function, and



   calculation of the dose of potentially toxic drugs that are excreted



   by the kidneys.

 

 12  Updated Reference Range 

 

 13  Updated reference range on new analyzer 3-2017

 

 14  Updated reference range on new analyzer 3-2017

 

 15  Concerning GFR Guidelines:



   Normal function or mild renal disease, if clinically at risk:  >/=60



   mL/min



   Moderately decreased:  30-59



   Severely decreased:  15-29



   Renal failure:  <15



   



   Glomerular Filtration Rate (GFR) is estimated based on the MDRD



   equation, which assumes a steady state for creatinine as recommended



   by the National Kidney Disease Education Program in conjunction with



   the National Institutes of Health and the National Kidney Foundation.



   



   Clinical conditions in which it may be necessary to measure GFR by



   using clearance methods include extremes of age and body size, severe



   malnutrition or obesity, diseases of skeletal muscle, paraplegia or



   quadriplegia, vegetarian diet, rapidly changing kidney function, and



   calculation of the dose of potentially toxic drugs that are excreted



   by the kidneys.

 

 16  Concerning GFR Guidelines for  Americans:



   Normal function or mild renal disease, if clinically at risk:  >/=60



   mL/min



   Moderately decreased:  30-59



   Severely decreased:  15-29



   Renal failure:  <15

 

 17  Updated Reference Range 

 

 18  Per NCEP ATP III Guidelines:



   Results lower than 40 mg/dL are suggestive of increased risk for



   coronary artery disease.  Results > or=to 60 mg/dL are considered a



   negative risk factor.

 

 19  Per NCEP ATP III Guidelines:



   Normal Population <130



   Patients with medical conditions: CHD/DM



   Optimal: <100



   Borderline high: 130-159



   High: 160-189



   Very high: >189

 

 20  E11.65

 

 21  Elevated levels of HbA1c suggest the need for more



   aggressive treatment of glycemia.



   



   The American Diabetes Association recommends that a primary



   goal of therapy should be a HbA1c of <7% and that physicians



   should re-evaluate the treatment regimen in patients with



   HbA1c values consistently >8%.

 

 22  Note:



   Persistent reduction for 3 months or more in an eGFR <60



   mL/min/1.73 m2 defines CKD.  Patients with eGFR values >/=60



   mL/min/1.73 m2 may also have CKD if evidence of persistent



   proteinuria is present.



   



   The original MDRD equation for estimated GFR is not valid



   for patients less than 18 years of age.



   



   Additional information may be found at www.kdoqi.org.

 

 23  Reference Guidelines*:



   Desirable: ........... < 200 mg/dL



   Borderline High: ..... 200-239 mg/dL



   High: ................ >=240 mg/dL



   



   * The National Cholesterol Education Program (NCEP)

 

 24  Reference Guidelines*:



   Normal: ............. < 150 mg/dL



   Borderline High: .... 150-199 mg/dL



   High: ............... 200-499 mg/dL



   Very High: .......... > 500 mg/dL



   * Source: National Cholesterol Education Program (NCEP)

 

 25  Reference Guidelines*:



   Low HDL: ..... < 40 mg/dL



   Normal:  ..... 40-60 mg/dL



   Desirable: ... > 60 mg/dL



   



   *The National Cholesterol Education Program(NCEP)

 

 26  Reference Guidelines*:



   Optimal:........... <100 mg/dL



   Near Optimal....... 100-129 mg/dL



   Borderline High.... 130-159 mg/dL



   High............... 160-189 mg/dL



   Very High.......... >=190 mg/dL



   * Source: National Cholesterol Education Program (NCEP)

 

 27  Updated reference range on new analyzer 3-2017

 

 28  Updated reference range on new analyzer 3-2017

 

 29  Concerning GFR Guidelines:



   Normal function or mild renal disease, if clinically at risk:  >/=60



   mL/min



   Moderately decreased:  30-59



   Severely decreased:  15-29



   Renal failure:  <15



   



   Glomerular Filtration Rate (GFR) is estimated based on the MDRD



   equation, which assumes a steady state for creatinine as recommended



   by the National Kidney Disease Education Program in conjunction with



   the National Institutes of Health and the National Kidney Foundation.



   



   Clinical conditions in which it may be necessary to measure GFR by



   using clearance methods include extremes of age and body size, severe



   malnutrition or obesity, diseases of skeletal muscle, paraplegia or



   quadriplegia, vegetarian diet, rapidly changing kidney function, and



   calculation of the dose of potentially toxic drugs that are excreted



   by the kidneys.

 

 30  Concerning GFR Guidelines for  Americans:



   Normal function or mild renal disease, if clinically at risk:  >/=60



   mL/min



   Moderately decreased:  30-59



   Severely decreased:  15-29



   Renal failure:  <15

 

 31  Updated reference range on new analyzer 3-2017

 

 32  L98.9

 

 33  RARE WHITE BLOOD CELLS

 

 34  RARE GRAM POSITIVE COCCI

 

 35  NO PATHOGENS ISOLATED

 

 36  SPECIMEN DESCRIPTION        UMBILICUS



   SPECIAL REQUESTS            NONE



   GRAM STAIN                  MODERATE (10 TO 25/LPF)  WHITE BLOOD CELLS



   NO BACTERIA



   CULTURE RESULTS             NO GROWTH



   REPORT STATUS               FINAL 2017



   Unless otherwise specified, testing performed by [facility]



   [facility]  [address] [place] [address]  [id number]

 

 37  Concerning GFR Guidelines:



   Normal function or mild renal disease, if clinically at risk:  >/=60



   mL/min



   Moderately decreased:  30-59



   Severely decreased:  15-29



   Renal failure:  <15



   



   Glomerular Filtration Rate (GFR) is estimated based on the MDRD



   equation, which assumes a steady state for creatinine as recommended



   by the National Kidney Disease Education Program in conjunction with



   the National Institutes of Health and the National Kidney Foundation.



   



   Clinical conditions in which it may be necessary to measure GFR by



   using clearance methods include extremes of age and body size, severe



   malnutrition or obesity, diseases of skeletal muscle, paraplegia or



   quadriplegia, vegetarian diet, rapidly changing kidney function, and



   calculation of the dose of potentially toxic drugs that are excreted



   by the kidneys.

 

 38  Concerning GFR Guidelines for  Americans:



   Normal function or mild renal disease, if clinically at risk:  >/=60



   mL/min



   Moderately decreased:  30-59



   Severely decreased:  15-29



   Renal failure:  <15

 

 39  Per NCEP ATP III Guidelines:



   Results lower than 40 mg/dL are suggestive of increased risk for



   coronary artery disease.  Results > or=to 60 mg/dL are considered a



   negative risk factor.

 

 40  Per NCEP ATP III Guidelines:



   Normal Population <130



   Patients with medical conditions: CHD/DM



   Optimal: <100



   Borderline high: 130-159



   High: 160-189



   Very high: >189

 

 41  Concerning GFR Guidelines:



   Normal function or mild renal disease, if clinically at risk:  >/=60



   mL/min



   Moderately decreased:  30-59



   Severely decreased:  15-29



   Renal failure:  <15



   



   Glomerular Filtration Rate (GFR) is estimated based on the MDRD



   equation, which assumes a steady state for creatinine as recommended



   by the National Kidney Disease Education Program in conjunction with



   the National Institutes of Health and the National Kidney Foundation.



   



   Clinical conditions in which it may be necessary to measure GFR by



   using clearance methods include extremes of age and body size, severe



   malnutrition or obesity, diseases of skeletal muscle, paraplegia or



   quadriplegia, vegetarian diet, rapidly changing kidney function, and



   calculation of the dose of potentially toxic drugs that are excreted



   by the kidneys.

 

 42  Concerning GFR Guidelines for  Americans:



   Normal function or mild renal disease, if clinically at risk:  >/=60



   mL/min



   Moderately decreased:  30-59



   Severely decreased:  15-29



   Renal failure:  <15

 

 43  Microbiology results



   SOURCE



   URINE



   COLONY COUNT



   >100,000 CFU/ML



   PRELIMINARY RESULT



   Gram Negative Kieran. ID & Sensitivity to Follow.



   FINAL RESULT



   Escherichia coli (Isolate 1)



   Sensitivity Analysis                           Isolate 1



   --------------------                           ---------



   AMIKACIN                                       <=16    S



   AMPICILLIN                                      >16    R



   AMPICILLIN/SULBACTAM                          >16/8    R



   AZTREONAM                                       <=4    S



   CEFAZOLIN                                       <=2    S



   CEFEPIME                                        <=8    S



   CEFTAZIDIME                                     <=1    S



   CEFTRIAXONE                                     <=1    S



   CIPROFLOXACIN                                   <=1    S



   ERTAPENEM                                     <=0.5    S



   GENTAMYCIN                                      <=2    S



   IMIPENEM                                        <=1    S



   LEVOFLOXACIN                                    <=2    S



   NITROFURANTOIN                                 <=32    S



   PIPERACILLIN/TAZOBACTAM                        <=16    S



   TETRACYCLINE                                    <=4    S



   TOBRAMYCIN                                      <=4    S



   TRIMETHOPRIM/SULFAMETHOXAZ                   <=2/38    S



   S=Sensitive;I=Indeterminate;R=Resistant

 

 44  Concerning GFR Guidelines:



   Normal function or mild renal disease, if clinically at risk:  >/=60



   mL/min



   Moderately decreased:  30-59



   Severely decreased:  15-29



   Renal failure:  <15



   



   Glomerular Filtration Rate (GFR) is estimated based on the MDRD



   equation, which assumes a steady state for creatinine as recommended



   by the National Kidney Disease Education Program in conjunction with



   the National Institutes of Health and the National Kidney Foundation.



   



   Clinical conditions in which it may be necessary to measure GFR by



   using clearance methods include extremes of age and body size, severe



   malnutrition or obesity, diseases of skeletal muscle, paraplegia or



   quadriplegia, vegetarian diet, rapidly changing kidney function, and



   calculation of the dose of potentially toxic drugs that are excreted



   by the kidneys.

 

 45  Concerning GFR Guidelines for  Americans:



   Normal function or mild renal disease, if clinically at risk:  >/=60



   mL/min



   Moderately decreased:  30-59



   Severely decreased:  15-29



   Renal failure:  <15

 

 46  Per NCEP ATP III Guidelines:



   Results lower than 40 mg/dL are suggestive of increased risk for



   coronary artery disease.  Results > or=to 60 mg/dL are considered a



   negative risk factor.

 

 47  Per NCEP ATP III Guidelines:



   Normal Population <130



   Patients with medical conditions: CHD/DM



   Optimal: <100



   Borderline high: 130-159



   High: 160-189



   Very high: >189

 

 48  SPECIMEN DESCRIPTION        THROAT SWAB



   CULTURE RESULTS             NORMAL THROAT BRONSON



   NO BETA HEMOLYTIC STREPTOCOCCI ISOLATED



   REPORT STATUS               FINAL 2016



   Unless otherwise specified, testing performed by [facility]



   [facility]  [address] [place] [address]  [id number]

 

 49  Concerning GFR Guidelines for  Americans:



   Normal function or mild renal disease, if clinically at risk:  >/=60



   mL/min



   Moderately decreased:  30-59



   Severely decreased:  15-29



   Renal failure:  <15

 

 50  Concerning GFR Guidelines:



   Normal function or mild renal disease, if clinically at risk:  >/=60



   mL/min



   Moderately decreased:  30-59



   Severely decreased:  15-29



   Renal failure:  <15



   



   Glomerular Filtration Rate (GFR) is estimated based on the MDRD



   equation, which assumes a steady state for creatinine as recommended



   by the National Kidney Disease Education Program in conjunction with



   the National Institutes of Health and the National Kidney Foundation.



   



   Clinical conditions in which it may be necessary to measure GFR by



   using clearance methods include extremes of age and body size, severe



   malnutrition or obesity, diseases of skeletal muscle, paraplegia or



   quadriplegia, vegetarian diet, rapidly changing kidney function, and



   calculation of the dose of potentially toxic drugs that are excreted



   by the kidneys.







Procedures







 Date  CPT Code  Description  Status

 

 10/25/2018  80117  Brief Emotional/Behav Assessment W/ Scoring Doc Per  
Completed



     Standard Inst  

 

 2017  24303  Echography Abdominal Limited  Completed

 

 2017  46198  Measure Blood Oxygen Level Single Determination  Completed

 

 2016  00034  Measure Blood Oxygen Level Single Determination  Completed

 

 2016  25159  Measure Blood Oxygen Level Single Determination  Completed

 

 2016  58158  Electrocardiogram Complete  Completed







Encounters







 Type  Date  Location  Provider  CPT E/M  Dx

 

 Office Visit  2018  3:00p  [facility]  [place]  [address]  E11.65









 E78.2

 

 Z68.31









 Office Visit  2017  3:00p  [facility] [place]  [address]  J01.00

 

 Office Visit  10/13/2017  3:30p  [facility] [place]  [address]  E11.65

 

 Office Visit  2017  3:30p  [facility] [place]  [address]  N89.8

 

 Office Visit  2017  3:15p  [facility]  [place]  [address]  E11.65

 

 Office Visit  2017  3:00p  [facility]  [place]  [address]  G43.009

 

 Office Visit  2017  3:00p  [facility] [place]  [address]  L02.216

 

 Office Visit  2017  3:30p  [facility]  [place]  [address]  J06.9

 

 Office Visit  2016  2:30p  [facility] [place]  [address]  M54.5









 M54.31









 Office Visit  11/15/2016  3:00p  [facility] [place]  [address]  L02.216









 E11.65









 Office Visit  10/18/2016  1:45p  [facility]  [place], [place]  [address]  L02.216

 

 Office Visit  2016  2:45p  [facility] [place]  [address]  N76.0









 R21









 Office Visit  08/15/2016  3:00p  [facility] [place]  [address]  E11.65

 

 Office Visit  2016 10:15a  [facility]  [place], [place]  [address]  R10.30









 R05









 Office Visit  2016  3:00p  [facility]  [place], [place]  [address]  E11.65

 

 Office Visit  2016  1:45p  [place]  [address]  J02.9

 

 Office Visit  2016  1:30p  [facility]  [place], [place]  [address]  E11.65









 R07.89









 Office Visit  03/10/2016  1:15p  [facility]  [place]  [address]  E11.65

 

 Office Visit  2016  2:00p  [facility]  [place]  [address]  R07.89







Plan of Care

10/25/2018 - [place], PAE11.65 Type 2 diabetes mellitus with 
hyperglycemiaNew Labs:Hemoglobin Y5oDapsg (BMP)Comments:A1C has improved to 6.7%
Continue metforminDiet changes, exerciseFollow up:6 months labs 1 week 
mxpzsM32.89 Encounter for screening for other disorderComments:PHQ 2 
azzeaukkR49.009 Migraine w/o aura, not intractable, w/o status 
migrainosusComments:Continue triptanAdvised can repeat in 2 hours if 
cxtukvT52.5 Low back painNew Medication:Cyclobenzaprine HCL 5 mgComments:
Suspect muscularMuscle relaxer - caution sedationHeating padCall if symptoms 
worsen or xldftqfL72.30 Body mass index (BMI) 30.0-30.9, adultComments:Congrats 
on weight loss!Diet changes, exercise to help with continued weight loss